# Patient Record
Sex: MALE | Race: WHITE | NOT HISPANIC OR LATINO | URBAN - METROPOLITAN AREA
[De-identification: names, ages, dates, MRNs, and addresses within clinical notes are randomized per-mention and may not be internally consistent; named-entity substitution may affect disease eponyms.]

---

## 2018-09-28 ENCOUNTER — TRANSCRIBE ORDERS (OUTPATIENT)
Dept: ADMINISTRATIVE | Facility: HOSPITAL | Age: 23
End: 2018-09-28

## 2018-09-28 DIAGNOSIS — E06.3 HASHIMOTO'S DISEASE: Primary | ICD-10-CM

## 2018-10-08 ENCOUNTER — HOSPITAL ENCOUNTER (OUTPATIENT)
Dept: ULTRASOUND IMAGING | Facility: HOSPITAL | Age: 23
Discharge: HOME | End: 2018-10-08
Admitting: INTERNAL MEDICINE

## 2018-10-08 DIAGNOSIS — E06.3 HASHIMOTO'S DISEASE: ICD-10-CM

## 2018-10-08 PROCEDURE — 76536 US EXAM OF HEAD AND NECK: CPT

## 2022-09-06 ENCOUNTER — APPOINTMENT (OUTPATIENT)
Dept: GENERAL RADIOLOGY | Facility: HOSPITAL | Age: 27
End: 2022-09-06

## 2022-09-06 ENCOUNTER — HOSPITAL ENCOUNTER (EMERGENCY)
Facility: HOSPITAL | Age: 27
Discharge: HOME | End: 2022-09-07
Attending: EMERGENCY MEDICINE | Admitting: EMERGENCY MEDICINE

## 2022-09-06 DIAGNOSIS — M25.511 ACUTE PAIN OF RIGHT SHOULDER: Primary | ICD-10-CM

## 2022-09-06 PROCEDURE — 73030 X-RAY EXAM OF SHOULDER: CPT

## 2022-09-06 PROCEDURE — 99282 EMERGENCY DEPT VISIT SF MDM: CPT

## 2022-09-07 VITALS
TEMPERATURE: 97.9 F | DIASTOLIC BLOOD PRESSURE: 77 MMHG | OXYGEN SATURATION: 97 % | SYSTOLIC BLOOD PRESSURE: 126 MMHG | HEART RATE: 62 BPM | RESPIRATION RATE: 18 BRPM

## 2022-09-07 RX ORDER — ACETAMINOPHEN 500 MG
1000 TABLET ORAL EVERY 8 HOURS PRN
Qty: 30 TABLET | Refills: 0 | Status: SHIPPED | OUTPATIENT
Start: 2022-09-07

## 2022-09-07 RX ORDER — IBUPROFEN 600 MG/1
600 TABLET ORAL EVERY 6 HOURS PRN
Qty: 30 TABLET | Refills: 0 | Status: SHIPPED | OUTPATIENT
Start: 2022-09-07

## 2022-09-07 RX ORDER — LIDOCAINE 50 MG/G
1 PATCH TOPICAL EVERY 24 HOURS
Qty: 30 PATCH | Refills: 0 | Status: SHIPPED | OUTPATIENT
Start: 2022-09-07

## 2022-09-07 NOTE — ED PROVIDER NOTES
EMERGENCY DEPARTMENT ENCOUNTER  I wore full protective equipment throughout this patient encounter including a N95 mask, eye shield, gown and gloves. Hand hygiene was performed before donning protective equipment and after removal when leaving the room.    Room Number:  21/21  Date of encounter:  9/7/2022  PCP: Yoselyn Seay APRN    HPI:  Context: Oskar Chamorro is a 27 y.o. male who presents to the ED c/o chief complaint of right shoulder pain.  Patient complains of some right shoulder pain for the last week.  Patient complains of burning pain on top of his shoulder, worse with movements.  Patient reports that he was taking a shower today, was reaching upward and made unusual movement and had increased pain in his arm.  Patient reports that he has guarding his arm and was having difficulty lifting it upwards, pain is improved, now is using his arm more normally.  Patient denies any trauma to his arm.  Patient denies any weakness or numbness.  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  Patient denies any history of prior fractures or surgeries at that side.    MEDICAL HISTORY REVIEW  Reviewed in EPIC    PAST MEDICAL HISTORY  Active Ambulatory Problems     Diagnosis Date Noted   • No Active Ambulatory Problems     Resolved Ambulatory Problems     Diagnosis Date Noted   • No Resolved Ambulatory Problems     No Additional Past Medical History       PAST SURGICAL HISTORY  No past surgical history on file.    FAMILY HISTORY  No family history on file.    SOCIAL HISTORY  Social History     Socioeconomic History   • Marital status: Single       ALLERGIES  Patient has no known allergies.    The patient's allergies have been reviewed    REVIEW OF SYSTEMS  All systems reviewed and negative except for those discussed in HPI.     PHYSICAL EXAM  I have reviewed the triage vital signs and nursing notes.  ED Triage Vitals [09/06/22 2144]   Temp Heart Rate Resp BP SpO2   97.9 °F (36.6 °C) 64  18 132/77 97 %      Temp src Heart Rate Source Patient Position BP Location FiO2 (%)   Tympanic Monitor Standing Left arm --       General: No acute distress.  HENT: NCAT, PERRL, Nares patent.  Eyes: no scleral icterus.  Neck: trachea midline, no ROM limitations.  CV: regular rhythm, regular rate.  Respiratory: normal effort, CTAB.  Abdomen: soft, nondistended, NTTP, no rebound tenderness, no guarding or rigidity.  Musculoskeletal: no deformity.  Right shoulder: Normal in appearance, tenderness in the anterior and superior shoulder, AC joint is intact, no point tenderness, shoulder has full flexion and extension, full abduction and abduction, negative empty can test. Positive thumbs up/okay sign/fingers abduct/fingers abduct, sensation intact light touch radial/medial/ulnar nerve distribution, 2+ radial and ulnar pulses, brisk cap refill all digits.  Neuro: alert, moves all extremities, follows commands.  Skin: warm, dry.    LAB RESULTS  No results found for this or any previous visit (from the past 24 hour(s)).    I ordered the above labs and reviewed the results.    RADIOLOGY  XR Shoulder 2+ View Right    Result Date: 9/6/2022  2 VIEWS RIGHT SHOULDER  HISTORY: Right shoulder pain  COMPARISON: None available.  FINDINGS: No acute fracture or subluxation of the right shoulder seen. There is no significant degenerative change. No aggressive osseous abnormalities are identified.      No acute findings.  This report was finalized on 9/6/2022 10:38 PM by Dr. Cordelia Lauren M.D.        I ordered the above noted radiological studies. I reviewed the images and results. I agree with the radiologist interpretation.    PROCEDURES  Procedures    MEDICATIONS GIVEN IN ER  Medications - No data to display    PROGRESS, DATA ANALYSIS, CONSULTS, AND MEDICAL DECISION MAKING  A complete history and physical exam have been performed.  All available laboratory and imaging results have been reviewed by myself prior to  disposition.    MDM  After the initial H&P, I discussed pertinent information from history and physical exam with patient/family.  Discussed differential diagnosis.  Discussed plan for ED evaluation/workup/treatment.  All questions answered.  Patient/family is agreeable with plan.       AS OF 00:04 EDT VITALS:    BP - 132/77  HR - 64  TEMP - 97.9 °F (36.6 °C) (Tympanic)  O2 SATS - 97%    DIAGNOSIS  Final diagnoses:   Acute pain of right shoulder         DISPOSITION  DISCHARGE    Patient discharged in stable condition.    Reviewed implications of results, diagnosis, meds, responsibility to follow up, warning signs and symptoms of possible worsening, potential complications and reasons to return to ER.    Patient/Family voiced understanding of above instructions.    Discussed plan for discharge, as there is no emergent indication for admission. Patient referred to primary care provider for BP management due to today's BP. Pt/family is agreeable and understands need for follow up and repeat testing.  Pt is aware that discharge does not mean that nothing is wrong but it indicates no emergency is present that requires admission and they must continue care with follow-up as given below or physician of their choice.     FOLLOW-UP  Yoselyn Seay, APRBRITTANEY  [address] [facility]  [address]  [facility] [id number] [phone]    Schedule an appointment as soon as possible for a visit in 2 days  even if well    Kris Joaquin MD  [address]  [address]  [facility] [id number] [phone]    Schedule an appointment as soon as possible for a visit in 2 days           Medication List      New Prescriptions    acetaminophen 500 MG tablet  Commonly known as: TYLENOL  Take 2 tablets by mouth Every 8 (Eight) Hours As Needed for Mild Pain.     ibuprofen 600 MG tablet  Commonly known as: ADVIL,MOTRIN  Take 1 tablet by mouth Every 6 (Six) Hours As Needed for Mild Pain.     lidocaine 5 %  Commonly known as: Lidoderm  Place 1 patch on  the skin as directed by provider Daily. Remove & Discard patch within 12 hours or as directed by MD           Where to Get Your Medications      You can get these medications from any pharmacy    Bring a paper prescription for each of these medications  · acetaminophen 500 MG tablet  · ibuprofen 600 MG tablet  · lidocaine 5 %          Frank Galvez MD  09/07/22 0004

## 2022-09-07 NOTE — ED TRIAGE NOTES
Pt c/o right shoulder pain that started approx 1wk ago. Tonight while in shower, he reached up when it dropped to his side. Peripheral pulse strong    Pt wearing mask on arrival. Staff wearing mask and goggles at time of triage.